# Patient Record
Sex: FEMALE | Race: WHITE | NOT HISPANIC OR LATINO | Employment: FULL TIME | URBAN - METROPOLITAN AREA
[De-identification: names, ages, dates, MRNs, and addresses within clinical notes are randomized per-mention and may not be internally consistent; named-entity substitution may affect disease eponyms.]

---

## 2023-08-14 ENCOUNTER — OFFICE VISIT (OUTPATIENT)
Dept: PRIMARY CARE | Facility: CLINIC | Age: 38
End: 2023-08-14
Payer: COMMERCIAL

## 2023-08-14 VITALS
BODY MASS INDEX: 34.01 KG/M2 | TEMPERATURE: 98.5 F | OXYGEN SATURATION: 99 % | WEIGHT: 180 LBS | SYSTOLIC BLOOD PRESSURE: 114 MMHG | DIASTOLIC BLOOD PRESSURE: 82 MMHG | HEART RATE: 92 BPM

## 2023-08-14 DIAGNOSIS — Z00.00 ROUTINE ADULT HEALTH MAINTENANCE: Primary | ICD-10-CM

## 2023-08-14 DIAGNOSIS — F33.42 RECURRENT MAJOR DEPRESSIVE DISORDER, IN FULL REMISSION (CMS-HCC): ICD-10-CM

## 2023-08-14 DIAGNOSIS — D49.1 NEOPLASM OF LUNG: ICD-10-CM

## 2023-08-14 PROBLEM — F43.10 POSTTRAUMATIC STRESS DISORDER: Status: ACTIVE | Noted: 2021-12-29

## 2023-08-14 PROBLEM — E78.2 MIXED HYPERLIPIDEMIA: Status: ACTIVE | Noted: 2021-12-29

## 2023-08-14 PROCEDURE — 99395 PREV VISIT EST AGE 18-39: CPT | Performed by: FAMILY MEDICINE

## 2023-08-14 RX ORDER — PREDNISONE 5 MG/1
5 TABLET ORAL DAILY
COMMUNITY
Start: 2022-11-29 | End: 2023-08-14 | Stop reason: ALTCHOICE

## 2023-08-14 RX ORDER — ALBUTEROL SULFATE 90 UG/1
2 AEROSOL, METERED RESPIRATORY (INHALATION) DAILY PRN
COMMUNITY
Start: 2022-10-11 | End: 2023-08-14 | Stop reason: ALTCHOICE

## 2023-08-14 RX ORDER — SERTRALINE HYDROCHLORIDE 50 MG/1
50 TABLET, FILM COATED ORAL DAILY
COMMUNITY
Start: 2021-12-29 | End: 2023-08-14 | Stop reason: ALTCHOICE

## 2023-08-14 RX ORDER — ATORVASTATIN CALCIUM 10 MG/1
10 TABLET, FILM COATED ORAL DAILY
COMMUNITY
Start: 2021-12-29 | End: 2023-08-14 | Stop reason: ALTCHOICE

## 2023-08-14 RX ORDER — OMEPRAZOLE 20 MG/1
20 CAPSULE, DELAYED RELEASE ORAL
COMMUNITY
Start: 2022-11-15 | End: 2023-08-14 | Stop reason: ALTCHOICE

## 2023-08-14 RX ORDER — TRAZODONE HYDROCHLORIDE 50 MG/1
50 TABLET ORAL NIGHTLY
COMMUNITY
Start: 2022-09-23 | End: 2023-08-14 | Stop reason: ALTCHOICE

## 2023-08-14 NOTE — PROGRESS NOTES
Subjective   Patient ID: July Jennings is a 38 y.o. female who presents for Annual Exam and Abnormal CT Scan (Discuss CT scan of chest- pt states has another nodule and wants a second opinion).  HPI  Pt presents for CPE.  She has been doing fair.  Had another CT scan the other day which showed a new lesion on her VIKAS.  They recommended CT/PET or recheck in 3 mo.  She would like second opinion.  Having a lot of stress right now.  Step father wants to throw her family out of the house.    Patient Active Problem List   Diagnosis    Mixed hyperlipidemia    Posttraumatic stress disorder       Social Connections: Not on file       Current Outpatient Medications on File Prior to Visit   Medication Sig Dispense Refill    albuterol 90 mcg/actuation inhaler Inhale 2 puffs once daily as needed.      atorvastatin (Lipitor) 10 mg tablet Take 1 tablet (10 mg) by mouth once daily.      omeprazole (PriLOSEC) 20 mg DR capsule Take 1 capsule (20 mg) by mouth once daily in the morning. Take before meals.      predniSONE (Deltasone) 5 mg tablet Take 1 tablet (5 mg) by mouth once daily.      sertraline (Zoloft) 50 mg tablet Take 1 tablet (50 mg) by mouth once daily.      traZODone (Desyrel) 50 mg tablet Take 1 tablet (50 mg) by mouth once daily at bedtime.       No current facility-administered medications on file prior to visit.        Vitals:    08/14/23 1424   BP: 114/82   Pulse: 92   Temp: 36.9 °C (98.5 °F)   SpO2: 99%     Vitals:    08/14/23 1424   Weight: 81.6 kg (180 lb)       Review of Systems   All other systems reviewed and are negative.      Objective     Physical Exam  Vitals reviewed.   Constitutional:       General: She is not in acute distress.     Appearance: Normal appearance. She is well-developed. She is not diaphoretic.   HENT:      Head: Normocephalic and atraumatic.      Right Ear: Tympanic membrane normal.      Left Ear: Tympanic membrane normal.      Nose: Nose normal.      Mouth/Throat:      Mouth: Mucous  membranes are moist.   Eyes:      Pupils: Pupils are equal, round, and reactive to light.   Cardiovascular:      Rate and Rhythm: Normal rate and regular rhythm.      Heart sounds: Normal heart sounds. No murmur heard.     No friction rub. No gallop.   Pulmonary:      Effort: Pulmonary effort is normal.      Breath sounds: Normal breath sounds. No rales.   Abdominal:      General: Bowel sounds are normal.      Palpations: Abdomen is soft.      Tenderness: There is no abdominal tenderness.   Musculoskeletal:      Cervical back: Normal range of motion and neck supple.   Skin:     General: Skin is warm and dry.   Neurological:      Mental Status: She is alert.   Psychiatric:         Mood and Affect: Mood normal.         No visits with results within 2 Month(s) from this visit.   Latest known visit with results is:   Legacy Encounter on 11/07/2022   Component Date Value Ref Range Status    D-DIMER, QUANTITATIVE VTE EXCLUSION 11/07/2022 399  < or = 500 ng/mL FEU Final    Comment: The VTE Exclusion D-Dimer assay is reported in ng/mL Fibrinogen Equivalent   Units (FEU).     Per manufacturers instructions for use, a value of less than 500 ng/mL   (FEU) may help to exclude DVT or PE in outpatients when the assay is used   with a clinical pretest probability assessment. ([facility] must utilize and   document eCalc Wells Score Deep Vein Thrombosis Risk for DVT exclusion   only; Emergency Department should utilize  Guidelines for Emergency   Department Use of the VTE Exclusion D-Dimer and Clinical Pretest probability   assessment model for DVT or PE exclusion.)          Assessment/Plan   Problem List Items Addressed This Visit    None  Visit Diagnoses       Routine adult health maintenance    -  Primary    Recurrent major depressive disorder, in full remission (CMS/[facility] [place])              Agree with repeat CT scan of chest.  Offered more medications but pt declines treatment for her anxiety and depression because sertraline didn't  help.

## 2023-10-30 ENCOUNTER — LAB (OUTPATIENT)
Dept: LAB | Facility: LAB | Age: 38
End: 2023-10-30
Payer: COMMERCIAL

## 2023-10-30 DIAGNOSIS — Z00.00 ROUTINE ADULT HEALTH MAINTENANCE: ICD-10-CM

## 2023-10-30 DIAGNOSIS — D49.1 NEOPLASM OF LUNG: ICD-10-CM

## 2023-10-30 LAB
ALBUMIN SERPL BCP-MCNC: 4.2 G/DL (ref 3.4–5)
ALP SERPL-CCNC: 66 U/L (ref 33–110)
ALT SERPL W P-5'-P-CCNC: 14 U/L (ref 7–45)
ANION GAP SERPL CALC-SCNC: 10 MMOL/L (ref 10–20)
AST SERPL W P-5'-P-CCNC: 15 U/L (ref 9–39)
BASOPHILS # BLD AUTO: 0.09 X10*3/UL (ref 0–0.1)
BASOPHILS NFR BLD AUTO: 0.8 %
BILIRUB SERPL-MCNC: 1.1 MG/DL (ref 0–1.2)
BUN SERPL-MCNC: 9 MG/DL (ref 6–23)
CALCIUM SERPL-MCNC: 9.2 MG/DL (ref 8.6–10.3)
CHLORIDE SERPL-SCNC: 105 MMOL/L (ref 98–107)
CHOLEST SERPL-MCNC: 171 MG/DL (ref 0–199)
CHOLESTEROL/HDL RATIO: 4.5
CO2 SERPL-SCNC: 27 MMOL/L (ref 21–32)
CREAT SERPL-MCNC: 0.64 MG/DL (ref 0.5–1.05)
EOSINOPHIL # BLD AUTO: 0.45 X10*3/UL (ref 0–0.7)
EOSINOPHIL NFR BLD AUTO: 4.2 %
ERYTHROCYTE [DISTWIDTH] IN BLOOD BY AUTOMATED COUNT: 13.8 % (ref 11.5–14.5)
ERYTHROCYTE [SEDIMENTATION RATE] IN BLOOD BY WESTERGREN METHOD: 7 MM/H (ref 0–20)
GFR SERPL CREATININE-BSD FRML MDRD: >90 ML/MIN/1.73M*2
GLUCOSE SERPL-MCNC: 87 MG/DL (ref 74–99)
HCT VFR BLD AUTO: 39 % (ref 36–46)
HDLC SERPL-MCNC: 38.4 MG/DL
HGB BLD-MCNC: 12.8 G/DL (ref 12–16)
IMM GRANULOCYTES # BLD AUTO: 0.06 X10*3/UL (ref 0–0.7)
IMM GRANULOCYTES NFR BLD AUTO: 0.6 % (ref 0–0.9)
LDLC SERPL CALC-MCNC: 96 MG/DL
LYMPHOCYTES # BLD AUTO: 2.5 X10*3/UL (ref 1.2–4.8)
LYMPHOCYTES NFR BLD AUTO: 23.5 %
MCH RBC QN AUTO: 29.2 PG (ref 26–34)
MCHC RBC AUTO-ENTMCNC: 32.8 G/DL (ref 32–36)
MCV RBC AUTO: 89 FL (ref 80–100)
MONOCYTES # BLD AUTO: 0.59 X10*3/UL (ref 0.1–1)
MONOCYTES NFR BLD AUTO: 5.6 %
NEUTROPHILS # BLD AUTO: 6.94 X10*3/UL (ref 1.2–7.7)
NEUTROPHILS NFR BLD AUTO: 65.3 %
NON HDL CHOLESTEROL: 133 MG/DL (ref 0–149)
NRBC BLD-RTO: 0 /100 WBCS (ref 0–0)
PLATELET # BLD AUTO: 351 X10*3/UL (ref 150–450)
PMV BLD AUTO: 10.8 FL (ref 7.5–11.5)
POTASSIUM SERPL-SCNC: 4.3 MMOL/L (ref 3.5–5.3)
PROT SERPL-MCNC: 6.4 G/DL (ref 6.4–8.2)
RBC # BLD AUTO: 4.38 X10*6/UL (ref 4–5.2)
SODIUM SERPL-SCNC: 138 MMOL/L (ref 136–145)
TRIGL SERPL-MCNC: 183 MG/DL (ref 0–149)
VLDL: 37 MG/DL (ref 0–40)
WBC # BLD AUTO: 10.6 X10*3/UL (ref 4.4–11.3)

## 2023-10-30 PROCEDURE — 80053 COMPREHEN METABOLIC PANEL: CPT

## 2023-10-30 PROCEDURE — 36415 COLL VENOUS BLD VENIPUNCTURE: CPT

## 2023-10-30 PROCEDURE — 85652 RBC SED RATE AUTOMATED: CPT

## 2023-10-30 PROCEDURE — 85025 COMPLETE CBC W/AUTO DIFF WBC: CPT

## 2023-10-30 PROCEDURE — 80061 LIPID PANEL: CPT

## 2023-10-31 ENCOUNTER — TELEPHONE (OUTPATIENT)
Dept: PRIMARY CARE | Facility: CLINIC | Age: 38
End: 2023-10-31
Payer: COMMERCIAL

## 2023-10-31 NOTE — TELEPHONE ENCOUNTER
----- Message from Neeraj Posada MD sent at 10/31/2023 10:07 AM EDT -----  Patient's blood work looks good.  Cholesterol is worse than last year but still okay

## 2025-01-09 ENCOUNTER — OFFICE VISIT (OUTPATIENT)
Dept: PRIMARY CARE | Facility: CLINIC | Age: 40
End: 2025-01-09
Payer: COMMERCIAL

## 2025-01-09 ENCOUNTER — LAB (OUTPATIENT)
Dept: LAB | Facility: LAB | Age: 40
End: 2025-01-09
Payer: COMMERCIAL

## 2025-01-09 VITALS
BODY MASS INDEX: 34.77 KG/M2 | SYSTOLIC BLOOD PRESSURE: 120 MMHG | OXYGEN SATURATION: 97 % | HEART RATE: 122 BPM | TEMPERATURE: 97.5 F | WEIGHT: 184 LBS | DIASTOLIC BLOOD PRESSURE: 70 MMHG

## 2025-01-09 DIAGNOSIS — K52.9 GASTROENTERITIS: ICD-10-CM

## 2025-01-09 DIAGNOSIS — R11.0 NAUSEA: ICD-10-CM

## 2025-01-09 DIAGNOSIS — Z00.00 ROUTINE ADULT HEALTH MAINTENANCE: ICD-10-CM

## 2025-01-09 DIAGNOSIS — Z00.00 ROUTINE ADULT HEALTH MAINTENANCE: Primary | ICD-10-CM

## 2025-01-09 LAB
ALBUMIN SERPL BCP-MCNC: 4.5 G/DL (ref 3.4–5)
ALP SERPL-CCNC: 61 U/L (ref 33–110)
ALT SERPL W P-5'-P-CCNC: 23 U/L (ref 7–45)
ANION GAP SERPL CALC-SCNC: 12 MMOL/L (ref 10–20)
AST SERPL W P-5'-P-CCNC: 23 U/L (ref 9–39)
BASOPHILS # BLD AUTO: 0.03 X10*3/UL (ref 0–0.1)
BASOPHILS NFR BLD AUTO: 0.5 %
BILIRUB SERPL-MCNC: 0.8 MG/DL (ref 0–1.2)
BUN SERPL-MCNC: 13 MG/DL (ref 6–23)
CALCIUM SERPL-MCNC: 9 MG/DL (ref 8.6–10.3)
CHLORIDE SERPL-SCNC: 102 MMOL/L (ref 98–107)
CHOLEST SERPL-MCNC: 149 MG/DL (ref 0–199)
CHOLESTEROL/HDL RATIO: 3.8
CO2 SERPL-SCNC: 27 MMOL/L (ref 21–32)
CREAT SERPL-MCNC: 0.78 MG/DL (ref 0.5–1.05)
EGFRCR SERPLBLD CKD-EPI 2021: >90 ML/MIN/1.73M*2
EOSINOPHIL # BLD AUTO: 0.22 X10*3/UL (ref 0–0.7)
EOSINOPHIL NFR BLD AUTO: 3.4 %
ERYTHROCYTE [DISTWIDTH] IN BLOOD BY AUTOMATED COUNT: 14.2 % (ref 11.5–14.5)
FLUAV RNA RESP QL NAA+PROBE: NOT DETECTED
FLUBV RNA RESP QL NAA+PROBE: NOT DETECTED
GLUCOSE SERPL-MCNC: 86 MG/DL (ref 74–99)
HCT VFR BLD AUTO: 43.9 % (ref 36–46)
HDLC SERPL-MCNC: 39.5 MG/DL
HGB BLD-MCNC: 13.9 G/DL (ref 12–16)
IMM GRANULOCYTES # BLD AUTO: 0.04 X10*3/UL (ref 0–0.7)
IMM GRANULOCYTES NFR BLD AUTO: 0.6 % (ref 0–0.9)
LDLC SERPL CALC-MCNC: 88 MG/DL
LIPASE SERPL-CCNC: 10 U/L (ref 9–82)
LYMPHOCYTES # BLD AUTO: 2.02 X10*3/UL (ref 1.2–4.8)
LYMPHOCYTES NFR BLD AUTO: 31.4 %
MCH RBC QN AUTO: 27.9 PG (ref 26–34)
MCHC RBC AUTO-ENTMCNC: 31.7 G/DL (ref 32–36)
MCV RBC AUTO: 88 FL (ref 80–100)
MONOCYTES # BLD AUTO: 0.74 X10*3/UL (ref 0.1–1)
MONOCYTES NFR BLD AUTO: 11.5 %
NEUTROPHILS # BLD AUTO: 3.38 X10*3/UL (ref 1.2–7.7)
NEUTROPHILS NFR BLD AUTO: 52.6 %
NON HDL CHOLESTEROL: 110 MG/DL (ref 0–149)
NRBC BLD-RTO: 0 /100 WBCS (ref 0–0)
PLATELET # BLD AUTO: 420 X10*3/UL (ref 150–450)
POTASSIUM SERPL-SCNC: 3.7 MMOL/L (ref 3.5–5.3)
PROT SERPL-MCNC: 7.1 G/DL (ref 6.4–8.2)
RBC # BLD AUTO: 4.99 X10*6/UL (ref 4–5.2)
RSV RNA RESP QL NAA+PROBE: NOT DETECTED
SARS-COV-2 RNA RESP QL NAA+PROBE: NOT DETECTED
SODIUM SERPL-SCNC: 137 MMOL/L (ref 136–145)
TRIGL SERPL-MCNC: 107 MG/DL (ref 0–149)
VLDL: 21 MG/DL (ref 0–40)
WBC # BLD AUTO: 6.4 X10*3/UL (ref 4.4–11.3)

## 2025-01-09 PROCEDURE — 87637 SARSCOV2&INF A&B&RSV AMP PRB: CPT

## 2025-01-09 PROCEDURE — 83690 ASSAY OF LIPASE: CPT

## 2025-01-09 PROCEDURE — 80061 LIPID PANEL: CPT

## 2025-01-09 PROCEDURE — 99213 OFFICE O/P EST LOW 20 MIN: CPT | Performed by: FAMILY MEDICINE

## 2025-01-09 PROCEDURE — 80053 COMPREHEN METABOLIC PANEL: CPT

## 2025-01-09 PROCEDURE — 85025 COMPLETE CBC W/AUTO DIFF WBC: CPT

## 2025-01-09 RX ORDER — ONDANSETRON 4 MG/1
4 TABLET, ORALLY DISINTEGRATING ORAL EVERY 8 HOURS PRN
Qty: 20 TABLET | Refills: 0 | Status: SHIPPED | OUTPATIENT
Start: 2025-01-09 | End: 2025-01-16

## 2025-01-09 NOTE — LETTER
January 9, 2025     Patient: July Jennings   YOB: 1985   Date of Visit: 1/9/2025       To Whom It May Concern:    July Jennings was seen in my clinic on 1/9/2025 at 10:50 am. Please excuse July for her absence from 1/7 to 1/10 because of illness.    If you have any questions or concerns, please don't hesitate to call.         Sincerely,         Neeraj Posada MD        CC: No Recipients

## 2025-01-09 NOTE — PROGRESS NOTES
Subjective   Patient ID: July Jennings is a 39 y.o. female who presents for Fever, Vomiting, Diarrhea, Fatigue, and Headache (X 2 days).  HPI  Patient presents because she is having fever, vomiting, diarrhea fatigue and headache for 2 days now.  She has a daughter who was recently sick at home.  She has vomited many times whenever she eats and has been having a lot of diarrhea this morning.  Some body aches.  Also having some tingling in the left side of her legs and numbness.  Feels lightheaded somewhat when she tries to do too much.  She has not had anything to eat or drink today.    Social History     Socioeconomic History    Marital status: Single     Spouse name: Not on file    Number of children: Not on file    Years of education: Not on file    Highest education level: Not on file   Occupational History    Not on file   Tobacco Use    Smoking status: Former     Current packs/day: 0.00     Types: Cigarettes     Quit date:      Years since quittin.0    Smokeless tobacco: Not on file   Substance and Sexual Activity    Alcohol use: Never    Drug use: Not on file    Sexual activity: Not on file   Other Topics Concern    Not on file   Social History Narrative    Not on file     Social Drivers of Health     Financial Resource Strain: Not on file   Food Insecurity: Not on file   Transportation Needs: Not on file   Physical Activity: Not on file   Stress: Not on file   Social Connections: Not on file   Intimate Partner Violence: Not on file   Housing Stability: Not on file         Patient Active Problem List   Diagnosis    Mixed hyperlipidemia    Posttraumatic stress disorder       Social Connections: Not on file       No current outpatient medications on file prior to visit.     No current facility-administered medications on file prior to visit.        Vitals:    25 1048   BP: 120/70   Pulse: (!) 122   Temp: 36.4 °C (97.5 °F)   SpO2: 97%     Vitals:    25 1048   Weight: 83.5 kg (184 lb)        Review of Systems    Objective     Physical Exam    No visits with results within 2 Month(s) from this visit.   Latest known visit with results is:   Lab on 10/30/2023   Component Date Value Ref Range Status    Cholesterol 10/30/2023 171  0 - 199 mg/dL Final          Age      Desirable   Borderline High   High     0-19 Y     0 - 169       170 - 199     >/= 200    20-24 Y     0 - 189       190 - 224     >/= 225         >24 Y     0 - 199       200 - 239     >/= 240   **All ranges are based on fasting samples. Specific   therapeutic targets will vary based on patient-specific   cardiac risk.    Pediatric guidelines reference:Pediatrics 2011, 128(S5).Adult guidelines reference: NCEP ATPIII Guidelines,DALLIN 2001, 258:2486-97    Venipuncture immediately after or during the administration of Metamizole may lead to falsely low results. Testing should be performed immediately prior to Metamizole dosing.    HDL-Cholesterol 10/30/2023 38.4  mg/dL Final      Age       Very Low   Low     Normal    High    0-19 Y    < 35      < 40     40-45     ----  20-24 Y    ----     < 40      >45      ----        >24 Y      ----     < 40     40-60      >60      Cholesterol/HDL Ratio 10/30/2023 4.5   Final      Ref Values  Desirable  < 3.4  High Risk  > 5.0    LDL Calculated 10/30/2023 96  <=99 mg/dL Final                                Near   Borderline      AGE      Desirable  Optimal    High     High     Very High     0-19 Y     0 - 109     ---    110-129   >/= 130     ----    20-24 Y     0 - 119     ---    120-159   >/= 160     ----      >24 Y     0 -  99   100-129  130-159   160-189     >/=190      VLDL 10/30/2023 37  0 - 40 mg/dL Final    Triglycerides 10/30/2023 183 (H)  0 - 149 mg/dL Final       Age         Desirable   Borderline High   High     Very High   0 D-90 D    19 - 174         ----         ----        ----  91 D- 9 Y     0 -  74        75 -  99     >/= 100      ----    10-19 Y     0 -  89        90 - 129     >/= 130       ----    20-24 Y     0 - 114       115 - 149     >/= 150      ----         >24 Y     0 - 149       150 - 199    200- 499    >/= 500    Venipuncture immediately after or during the administration of Metamizole may lead to falsely low results. Testing should be performed immediately prior to Metamizole dosing.    Non HDL Cholesterol 10/30/2023 133  0 - 149 mg/dL Final          Age       Desirable   Borderline High   High     Very High     0-19 Y     0 - 119       120 - 144     >/= 145    >/= 160    20-24 Y     0 - 149       150 - 189     >/= 190      ----         >24 Y    30 mg/dL above LDL Cholesterol goal      Glucose 10/30/2023 87  74 - 99 mg/dL Final    Sodium 10/30/2023 138  136 - 145 mmol/L Final    Potassium 10/30/2023 4.3  3.5 - 5.3 mmol/L Final    Chloride 10/30/2023 105  98 - 107 mmol/L Final    Bicarbonate 10/30/2023 27  21 - 32 mmol/L Final    Anion Gap 10/30/2023 10  10 - 20 mmol/L Final    Urea Nitrogen 10/30/2023 9  6 - 23 mg/dL Final    Creatinine 10/30/2023 0.64  0.50 - 1.05 mg/dL Final    eGFR 10/30/2023 >90  >60 mL/min/1.73m*2 Final    Calculations of estimated GFR are performed using the 2021 CKD-EPI Study Refit equation without the race variable for the IDMS-Traceable creatinine methods.  https://jasn.asnjournals.org/content/early/2021/09/22/ASN.3690510101    Calcium 10/30/2023 9.2  8.6 - 10.3 mg/dL Final    Albumin 10/30/2023 4.2  3.4 - 5.0 g/dL Final    Alkaline Phosphatase 10/30/2023 66  33 - 110 U/L Final    Total Protein 10/30/2023 6.4  6.4 - 8.2 g/dL Final    AST 10/30/2023 15  9 - 39 U/L Final    Bilirubin, Total 10/30/2023 1.1  0.0 - 1.2 mg/dL Final    ALT 10/30/2023 14  7 - 45 U/L Final    Patients treated with Sulfasalazine may generate falsely decreased results for ALT.    WBC 10/30/2023 10.6  4.4 - 11.3 x10*3/uL Final    nRBC 10/30/2023 0.0  0.0 - 0.0 /100 WBCs Final    RBC 10/30/2023 4.38  4.00 - 5.20 x10*6/uL Final    Hemoglobin 10/30/2023 12.8  12.0 - 16.0 g/dL Final    Hematocrit  10/30/2023 39.0  36.0 - 46.0 % Final    MCV 10/30/2023 89  80 - 100 fL Final    MCH 10/30/2023 29.2  26.0 - 34.0 pg Final    MCHC 10/30/2023 32.8  32.0 - 36.0 g/dL Final    RDW 10/30/2023 13.8  11.5 - 14.5 % Final    Platelets 10/30/2023 351  150 - 450 x10*3/uL Final    MPV 10/30/2023 10.8  7.5 - 11.5 fL Final    Neutrophils % 10/30/2023 65.3  40.0 - 80.0 % Final    Immature Granulocytes %, Automated 10/30/2023 0.6  0.0 - 0.9 % Final    Immature Granulocyte Count (IG) includes promyelocytes, myelocytes and metamyelocytes but does not include bands. Percent differential counts (%) should be interpreted in the context of the absolute cell counts (cells/UL).    Lymphocytes % 10/30/2023 23.5  13.0 - 44.0 % Final    Monocytes % 10/30/2023 5.6  2.0 - 10.0 % Final    Eosinophils % 10/30/2023 4.2  0.0 - 6.0 % Final    Basophils % 10/30/2023 0.8  0.0 - 2.0 % Final    Neutrophils Absolute 10/30/2023 6.94  1.20 - 7.70 x10*3/uL Final    Percent differential counts (%) should be interpreted in the context of the absolute cell counts (cells/uL).    Immature Granulocytes Absolute, Au* 10/30/2023 0.06  0.00 - 0.70 x10*3/uL Final    Lymphocytes Absolute 10/30/2023 2.50  1.20 - 4.80 x10*3/uL Final    Monocytes Absolute 10/30/2023 0.59  0.10 - 1.00 x10*3/uL Final    Eosinophils Absolute 10/30/2023 0.45  0.00 - 0.70 x10*3/uL Final    Basophils Absolute 10/30/2023 0.09  0.00 - 0.10 x10*3/uL Final    Sedimentation Rate 10/30/2023 7  0 - 20 mm/h Final       Assessment/Plan   Problem List Items Addressed This Visit    None  Visit Diagnoses         Codes    Routine adult health maintenance    -  Primary Z00.00    Relevant Orders    Lipid Panel    Comprehensive Metabolic Panel    CBC and Auto Differential    Nausea     R11.0    Relevant Medications    ondansetron ODT (Zofran-ODT) 4 mg disintegrating tablet    Gastroenteritis     K52.9        Possible gastroenteritis.  Wrote for ondansetron.  Push salty fluids.  Checking blood work to rule  out other causes of nausea and dizziness.